# Patient Record
Sex: MALE | Race: WHITE | Employment: FULL TIME | URBAN - METROPOLITAN AREA
[De-identification: names, ages, dates, MRNs, and addresses within clinical notes are randomized per-mention and may not be internally consistent; named-entity substitution may affect disease eponyms.]

---
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
## 2023-06-20 ENCOUNTER — OFFICE VISIT (OUTPATIENT)
Dept: PULMONOLOGY | Age: 18
End: 2023-06-20
Payer: COMMERCIAL

## 2023-06-20 VITALS
OXYGEN SATURATION: 98 % | HEIGHT: 71 IN | SYSTOLIC BLOOD PRESSURE: 120 MMHG | WEIGHT: 146 LBS | DIASTOLIC BLOOD PRESSURE: 88 MMHG | TEMPERATURE: 98.7 F | BODY MASS INDEX: 20.44 KG/M2 | RESPIRATION RATE: 18 BRPM | HEART RATE: 70 BPM

## 2023-06-20 DIAGNOSIS — J30.1 SEASONAL ALLERGIC RHINITIS DUE TO POLLEN: ICD-10-CM

## 2023-06-20 DIAGNOSIS — J45.40 MODERATE PERSISTENT ASTHMA WITHOUT COMPLICATION: Primary | ICD-10-CM

## 2023-06-20 PROCEDURE — 99214 OFFICE O/P EST MOD 30 MIN: CPT | Performed by: INTERNAL MEDICINE

## 2023-06-20 RX ORDER — ALBUTEROL SULFATE 90 UG/1
2 AEROSOL, METERED RESPIRATORY (INHALATION) EVERY 6 HOURS PRN
Qty: 36 G | Refills: 5 | Status: SHIPPED | OUTPATIENT
Start: 2023-06-20

## 2023-06-20 RX ORDER — MONTELUKAST SODIUM 10 MG/1
10 TABLET ORAL NIGHTLY
Qty: 30 TABLET | Refills: 5 | Status: SHIPPED | OUTPATIENT
Start: 2023-06-20 | End: 2023-06-20 | Stop reason: SDUPTHER

## 2023-06-20 RX ORDER — MONTELUKAST SODIUM 10 MG/1
TABLET ORAL
COMMUNITY
Start: 2023-05-04 | End: 2023-06-20 | Stop reason: SDUPTHER

## 2023-06-20 RX ORDER — MONTELUKAST SODIUM 10 MG/1
10 TABLET ORAL NIGHTLY
Qty: 30 TABLET | Refills: 5 | Status: SHIPPED | OUTPATIENT
Start: 2023-06-20 | End: 2023-07-20

## 2023-06-20 ASSESSMENT — SLEEP AND FATIGUE QUESTIONNAIRES
ESS TOTAL SCORE: 0
HOW LIKELY ARE YOU TO NOD OFF OR FALL ASLEEP WHILE SITTING INACTIVE IN A PUBLIC PLACE: 0
HOW LIKELY ARE YOU TO NOD OFF OR FALL ASLEEP WHEN YOU ARE A PASSENGER IN A CAR FOR AN HOUR WITHOUT A BREAK: 0
HOW LIKELY ARE YOU TO NOD OFF OR FALL ASLEEP WHILE LYING DOWN TO REST IN THE AFTERNOON WHEN CIRCUMSTANCES PERMIT: 0
HOW LIKELY ARE YOU TO NOD OFF OR FALL ASLEEP WHILE SITTING AND READING: 0
HOW LIKELY ARE YOU TO NOD OFF OR FALL ASLEEP WHILE SITTING QUIETLY AFTER LUNCH WITHOUT ALCOHOL: 0
HOW LIKELY ARE YOU TO NOD OFF OR FALL ASLEEP WHILE SITTING AND TALKING TO SOMEONE: 0
HOW LIKELY ARE YOU TO NOD OFF OR FALL ASLEEP WHILE WATCHING TV: 0
HOW LIKELY ARE YOU TO NOD OFF OR FALL ASLEEP IN A CAR, WHILE STOPPED FOR A FEW MINUTES IN TRAFFIC: 0

## 2023-06-20 NOTE — PROGRESS NOTES
PULMONARY outpatient progress note        REFERRED BY: Jace Hebert MD    REASON FOR CONSULTATION: Asthma with history of COVID x2 and abnormal pulmonary function test    Patient is being seen in follow-up for-    1. Moderate persistent asthma without complication    2. Seasonal allergic rhinitis due to pollen          HISTORY OF PRESENT ILLNESS:    Maico Calixto is a 19y.o. year old male here for evaluation of above problems  Denied any hospitalization or ER visits for breathing problems  Has been using medications as recommended with improvement in his symptomatology  The weather and allergies sometimes bother him  Uses albuterol sometimes before exercise with improvement  Not much of postnasal discharge  No acid reflux symptoms  No hospitalization for acute asthma      PAST MEDICAL HISTORY:       Diagnosis Date    Asthma     Closed right ankle fracture 2017    Seasonal allergies        SURGICAL HISTORY:  No past surgical history on file. ALLERGIES:  No Known Allergies    MEDICATIONS:   Current Outpatient Medications   Medication Sig Dispense Refill    BREO ELLIPTA 200-25 MCG/ACT AEPB inhaler INHALE ONE PUFF BY MOUTH INTO THE LUNGS DAILY 1 each 6    albuterol sulfate HFA (PROAIR HFA) 108 (90 Base) MCG/ACT inhaler Inhale 2 puffs into the lungs every 6 hours as needed for Wheezing 18 g 3    vitamin D (ERGOCALCIFEROL) 1.25 MG (39366 UT) CAPS capsule Take 1 capsule by mouth once a week 12 capsule 0    ipratropium-albuterol (DUONEB) 0.5-2.5 (3) MG/3ML SOLN nebulizer solution Inhale 3 mLs into the lungs every 4 hours      albuterol sulfate  (90 BASE) MCG/ACT inhaler Inhale 2 puffs into the lungs every 4 hours as needed for Wheezing or Shortness of Breath      montelukast (SINGULAIR) 10 MG tablet        No current facility-administered medications for this visit. FAMILY HISTORY: family history includes Stroke in his paternal grandmother.     SOCIAL AND OCCUPATIONAL HEALTH:  The patient is a

## 2023-10-05 ENCOUNTER — OFFICE VISIT (OUTPATIENT)
Dept: ORTHOPEDIC SURGERY | Age: 18
End: 2023-10-05
Payer: COMMERCIAL

## 2023-10-05 VITALS — WEIGHT: 146 LBS | RESPIRATION RATE: 14 BRPM | BODY MASS INDEX: 20.44 KG/M2 | HEIGHT: 71 IN

## 2023-10-05 DIAGNOSIS — M79.601 RIGHT ARM PAIN: Primary | ICD-10-CM

## 2023-10-05 PROCEDURE — 99203 OFFICE O/P NEW LOW 30 MIN: CPT | Performed by: ORTHOPAEDIC SURGERY

## 2023-10-05 RX ORDER — DICLOFENAC SODIUM 75 MG/1
75 TABLET, DELAYED RELEASE ORAL 2 TIMES DAILY WITH MEALS
Qty: 28 TABLET | Refills: 0 | Status: SHIPPED | OUTPATIENT
Start: 2023-10-05 | End: 2023-10-19

## 2023-10-05 NOTE — PROGRESS NOTES
ORTHOPEDIC PATIENT EVALUATION      HPI / Chief Complaint  Saira Mendenhall is a 19 y.o. right-hand-dominant male who presents for evaluation of his right arm. For the past 6 months he has noticed he end a bad in the mid biceps area extending posteriorly into the triceps. It is usually present when playing tennis. Afterwards  the area remains sore for a few days. He is also noticed occasional pain at night sleeping on the right side. He has not really noticed any swelling but recently noticed some bruising in the area. Reports having some associated numbness and tingling typically in the biceps and triceps muscle while playing tennis. When it is really bad he has pain extending proximally to the shoulder and distally to the elbow. He denies any precipitating trauma or injury. He has not attempted any treatment except for using over-the-counter ibuprofen. Past Medical History  Jam Marmolejo  has a past medical history of Asthma, Closed right ankle fracture, and Seasonal allergies. Past Surgical History  Jam Marmolejo  has no past surgical history on file. Current Medications  Current Outpatient Medications   Medication Sig Dispense Refill    montelukast (SINGULAIR) 10 MG tablet Take 1 tablet by mouth nightly 30 tablet 5    albuterol sulfate HFA (PROAIR HFA) 108 (90 Base) MCG/ACT inhaler Inhale 2 puffs into the lungs every 6 hours as needed for Wheezing 36 g 5    BREO ELLIPTA 200-25 MCG/ACT AEPB inhaler INHALE ONE PUFF BY MOUTH INTO THE LUNGS DAILY 1 each 6    vitamin D (ERGOCALCIFEROL) 1.25 MG (93290 UT) CAPS capsule Take 1 capsule by mouth once a week 12 capsule 0    ipratropium-albuterol (DUONEB) 0.5-2.5 (3) MG/3ML SOLN nebulizer solution Inhale 3 mLs into the lungs every 4 hours      albuterol sulfate  (90 BASE) MCG/ACT inhaler Inhale 2 puffs into the lungs every 4 hours as needed for Wheezing or Shortness of Breath       No current facility-administered medications for this visit.        Allergies  Allergies

## 2023-10-18 ENCOUNTER — HOSPITAL ENCOUNTER (OUTPATIENT)
Dept: MRI IMAGING | Age: 18
Discharge: HOME | End: 2023-10-20
Attending: ORTHOPAEDIC SURGERY
Payer: COMMERCIAL

## 2023-10-18 DIAGNOSIS — M79.601 RIGHT ARM PAIN: ICD-10-CM

## 2023-10-18 PROCEDURE — 73218 MRI UPPER EXTREMITY W/O DYE: CPT

## 2023-10-31 ENCOUNTER — TELEPHONE (OUTPATIENT)
Dept: ORTHOPEDIC SURGERY | Age: 18
End: 2023-10-31

## 2023-11-01 ENCOUNTER — TELEPHONE (OUTPATIENT)
Dept: ORTHOPEDIC SURGERY | Age: 18
End: 2023-11-01

## 2023-11-01 NOTE — TELEPHONE ENCOUNTER
Spoke  with Heather Baker at the [employer] regarding pts insurance denying MRI being done at [facility]. Heather Baker said she would route this to [facility] who was handling this pts pre auth for the MRI.

## 2023-11-01 NOTE — TELEPHONE ENCOUNTER
Fareed's insurance called and said they could not authorize his MRI to be done at [address] [place] [address].  Contact [employer] regarding meeting the criteria of having the MRI done at [facility]. Case # [id number]  phone number is [phone] I spoke with Isiah Herndon.

## 2023-11-06 ENCOUNTER — OFFICE VISIT (OUTPATIENT)
Dept: ORTHOPEDIC SURGERY | Age: 18
End: 2023-11-06

## 2023-11-06 VITALS — RESPIRATION RATE: 14 BRPM | BODY MASS INDEX: 20.72 KG/M2 | WEIGHT: 148 LBS | HEIGHT: 71 IN

## 2023-11-06 DIAGNOSIS — M79.601 RIGHT ARM PAIN: Primary | ICD-10-CM

## 2023-11-06 NOTE — PROGRESS NOTES
HPI: Joyce Rock is a 40-year-old here today to review the results of his right humerus MRI study which was completed on 10/18/2023. I did review the images independently with the patient today and it demonstrates no significant or obvious soft tissue or bone abnormality. There is some mild increase in signal on T2 weighted images with involving the distal third of the humerus. I explained to the patient that this may very well represent a stress reaction. I recommended a period of rest i.e. 2 to 3 months in addition to some physical therapy and then a gradual progressive return to his tennis. Prescription was provided today. I will see him back in my clinic as needed but he was encouraged to return or call at anytime with persistent or worsening symptoms or with any questions or concerns.

## 2023-12-26 DIAGNOSIS — J45.40 MODERATE PERSISTENT ASTHMA WITHOUT COMPLICATION: ICD-10-CM

## 2023-12-28 RX ORDER — FLUTICASONE FUROATE AND VILANTEROL 200; 25 UG/1; UG/1
POWDER RESPIRATORY (INHALATION)
Qty: 1 EACH | Refills: 6 | Status: SHIPPED | OUTPATIENT
Start: 2023-12-28

## 2023-12-28 RX ORDER — MONTELUKAST SODIUM 10 MG/1
10 TABLET ORAL NIGHTLY
Qty: 30 TABLET | Refills: 5 | Status: SHIPPED | OUTPATIENT
Start: 2023-12-28 | End: 2024-01-27

## 2024-01-02 ENCOUNTER — OFFICE VISIT (OUTPATIENT)
Dept: PULMONOLOGY | Age: 19
End: 2024-01-02
Payer: COMMERCIAL

## 2024-01-02 VITALS
RESPIRATION RATE: 18 BRPM | HEIGHT: 71 IN | SYSTOLIC BLOOD PRESSURE: 120 MMHG | TEMPERATURE: 98 F | OXYGEN SATURATION: 99 % | DIASTOLIC BLOOD PRESSURE: 80 MMHG | HEART RATE: 70 BPM | BODY MASS INDEX: 22.4 KG/M2 | WEIGHT: 160 LBS

## 2024-01-02 DIAGNOSIS — J30.1 SEASONAL ALLERGIC RHINITIS DUE TO POLLEN: ICD-10-CM

## 2024-01-02 DIAGNOSIS — J45.40 MODERATE PERSISTENT ASTHMA WITHOUT COMPLICATION: Primary | ICD-10-CM

## 2024-01-02 PROCEDURE — 99214 OFFICE O/P EST MOD 30 MIN: CPT | Performed by: INTERNAL MEDICINE

## 2024-01-02 RX ORDER — FLUTICASONE FUROATE AND VILANTEROL 100; 25 UG/1; UG/1
1 POWDER RESPIRATORY (INHALATION) DAILY
Qty: 1 EACH | Refills: 5 | Status: SHIPPED | OUTPATIENT
Start: 2024-01-02

## 2024-01-02 NOTE — PROGRESS NOTES
PULMONARY outpatient progress note        REFERRED BY: Bonnie Solo MD    REASON FOR CONSULTATION: Asthma with history of COVID x2 and abnormal pulmonary function test    Patient is being seen in follow-up for-    1. Moderate persistent asthma without complication    2. Seasonal allergic rhinitis due to pollen          HISTORY OF PRESENT ILLNESS:    Fareed العلي is a 18 y.o. year old male here for evaluation of above problems  Denied any hospitalization or ER visits for breathing problems  Has been using medications as recommended with improvement in his symptomatology  Uses albuterol sometimes before exercise with improvement, very rarely  Not much of postnasal discharge  No acid reflux symptoms  No hospitalization for acute asthma      PAST MEDICAL HISTORY:       Diagnosis Date    Asthma     Closed right ankle fracture 2017    Seasonal allergies        SURGICAL HISTORY:  No past surgical history on file.    ALLERGIES:  No Known Allergies    MEDICATIONS:   Current Outpatient Medications   Medication Sig Dispense Refill    fluticasone furoate-vilanterol (BREO ELLIPTA) 200-25 MCG/ACT AEPB inhaler INHALE ONE PUFF BY MOUTH INTO THE LUNGS DAILY 1 each 6    montelukast (SINGULAIR) 10 MG tablet Take 1 tablet by mouth nightly 30 tablet 5    albuterol sulfate HFA (PROAIR HFA) 108 (90 Base) MCG/ACT inhaler Inhale 2 puffs into the lungs every 6 hours as needed for Wheezing 36 g 5    ipratropium-albuterol (DUONEB) 0.5-2.5 (3) MG/3ML SOLN nebulizer solution Inhale 3 mLs into the lungs every 4 hours      albuterol sulfate  (90 BASE) MCG/ACT inhaler Inhale 2 puffs into the lungs every 4 hours as needed for Wheezing or Shortness of Breath       No current facility-administered medications for this visit.       FAMILY HISTORY: family history includes Stroke in his paternal grandmother.    SOCIAL AND OCCUPATIONAL HEALTH:  The patient is a Never smoker.  There  is not history of TB or TB exposure.  There is not

## 2024-01-03 ENCOUNTER — TELEPHONE (OUTPATIENT)
Dept: PULMONOLOGY | Age: 19
End: 2024-01-03

## 2024-01-04 NOTE — TELEPHONE ENCOUNTER
Received a notice back that brand name Breo is covered on pt's insurance.   I phoned [facility] spoke with Kandi patient just filled Breo 200 and was not suppose to be on the 200 according to his recent office visit with Dr. Pickering on 1/2/24.  Patients Breo script was decreased to 100 P/O last office note on 1/2/24.  So this is why a PA for Breo is coming over.  I informed Knadi that we need to cancel the Breo 200 script and the next fill should be for the new script Breo 100.  Kandi cancelled the refills for Breo 200 on her end and I cancelled the Breo 200 on our end.      I have LMOM for pt to call back so this can be explained to him.

## 2024-01-08 NOTE — TELEPHONE ENCOUNTER
Called pt and was able to speak with him informed him of the change on his inhaler and pt verbalized understanding,.

## 2024-05-08 RX ORDER — ALBUTEROL SULFATE 90 UG/1
2 AEROSOL, METERED RESPIRATORY (INHALATION) EVERY 6 HOURS PRN
Qty: 3 EACH | Refills: 2 | Status: SHIPPED | OUTPATIENT
Start: 2024-05-08

## 2024-05-08 NOTE — TELEPHONE ENCOUNTER
LAST VISIT: 1/2/24  NEXT VISIT: 7/30/24    Per last dictation patient is on albuterol. Please sign for refill if ok. Thank you.

## 2024-11-05 RX ORDER — FLUTICASONE FUROATE AND VILANTEROL 100; 25 UG/1; UG/1
1 POWDER RESPIRATORY (INHALATION) DAILY
Qty: 1 EACH | Refills: 5 | Status: SHIPPED | OUTPATIENT
Start: 2024-11-05

## 2024-11-05 NOTE — TELEPHONE ENCOUNTER
Last visit: 1/2/24  Next visit: no f/u scheduled.- \"cancelled by automated text - conf w/ pt - trans to schd\" patient never r/s.     Refill request for Breo. Per last dictation,patient using Breo 100. Last refilled 1/2/24 for 6 month supply. Please see pended script and advise.

## 2024-11-15 ENCOUNTER — TELEPHONE (OUTPATIENT)
Dept: PULMONOLOGY | Age: 19
End: 2024-11-15

## 2024-11-15 NOTE — TELEPHONE ENCOUNTER
Patient is over due for an appt, he cancelled his summer appt, never rescheduled.  He is calling for a refill on his Breo. Writer pended a script with a few refills, enough to get him through to your next available openings. Rx also also states that patient needs to call for an appt.

## 2024-11-16 RX ORDER — FLUTICASONE FUROATE AND VILANTEROL 100; 25 UG/1; UG/1
1 POWDER RESPIRATORY (INHALATION) DAILY
Qty: 1 EACH | Refills: 3 | Status: SHIPPED | OUTPATIENT
Start: 2024-11-16 | End: 2024-12-16